# Patient Record
Sex: MALE | Race: BLACK OR AFRICAN AMERICAN
[De-identification: names, ages, dates, MRNs, and addresses within clinical notes are randomized per-mention and may not be internally consistent; named-entity substitution may affect disease eponyms.]

---

## 2017-01-14 ENCOUNTER — HOSPITAL ENCOUNTER (EMERGENCY)
Dept: HOSPITAL 72 - EMR | Age: 32
Discharge: HOME | End: 2017-01-14
Payer: MEDICARE

## 2017-01-14 VITALS — HEIGHT: 70 IN | WEIGHT: 160 LBS | BODY MASS INDEX: 22.9 KG/M2

## 2017-01-14 VITALS — DIASTOLIC BLOOD PRESSURE: 83 MMHG | SYSTOLIC BLOOD PRESSURE: 122 MMHG

## 2017-01-14 VITALS — SYSTOLIC BLOOD PRESSURE: 122 MMHG | DIASTOLIC BLOOD PRESSURE: 83 MMHG

## 2017-01-14 DIAGNOSIS — N34.2: ICD-10-CM

## 2017-01-14 DIAGNOSIS — I12.0: Primary | ICD-10-CM

## 2017-01-14 DIAGNOSIS — N18.6: ICD-10-CM

## 2017-01-14 DIAGNOSIS — Z88.0: ICD-10-CM

## 2017-01-14 DIAGNOSIS — J02.9: ICD-10-CM

## 2017-01-14 DIAGNOSIS — Z99.2: ICD-10-CM

## 2017-01-14 DIAGNOSIS — E11.9: ICD-10-CM

## 2017-01-14 PROCEDURE — 99284 EMERGENCY DEPT VISIT MOD MDM: CPT

## 2017-01-15 ENCOUNTER — HOSPITAL ENCOUNTER (EMERGENCY)
Dept: HOSPITAL 72 - EMR | Age: 32
Discharge: HOME | End: 2017-01-15
Payer: MEDICARE

## 2017-01-15 VITALS — DIASTOLIC BLOOD PRESSURE: 74 MMHG | SYSTOLIC BLOOD PRESSURE: 127 MMHG

## 2017-01-15 VITALS — WEIGHT: 160 LBS | BODY MASS INDEX: 25.71 KG/M2 | HEIGHT: 66 IN

## 2017-01-15 VITALS — SYSTOLIC BLOOD PRESSURE: 127 MMHG | DIASTOLIC BLOOD PRESSURE: 74 MMHG

## 2017-01-15 DIAGNOSIS — L72.9: ICD-10-CM

## 2017-01-15 DIAGNOSIS — K64.9: Primary | ICD-10-CM

## 2017-01-15 DIAGNOSIS — I12.0: ICD-10-CM

## 2017-01-15 DIAGNOSIS — Z88.0: ICD-10-CM

## 2017-01-15 DIAGNOSIS — Z99.2: ICD-10-CM

## 2017-01-15 DIAGNOSIS — N18.6: ICD-10-CM

## 2017-01-15 PROCEDURE — 99282 EMERGENCY DEPT VISIT SF MDM: CPT

## 2017-01-18 NOTE — EMERGENCY ROOM REPORT
History of Present Illness


General


Chief Complaint:  General Complaint


Source:  Patient





Present Illness


HPI


Patient with r armpit swelling and tenderness - bump.  Also has bump in rectum.

  Pain is moderate and not radiate.  Sharp aching.  No rectal bleeding.  Able 

to move bowels.  No NVD.  No fever.





Seen yesterday:


Patient presented for a sore throat and urethral discharge.  Differential 

diagnosis included was not limited to strep pharyngitis, urethritis, herpes, 

yeast infection and among others. Patient's benign exam and does not appear to 

require any further imaging or laboratory testing at this time.  The patient 

was given azithromycin a prescription for antibiotics.The patient is advised to 

follow up with  primary care doctor in 1-2 days. The patient was advised that 

he would need further STD testing.   Patient is advised to return if any 

worsening condition or if any changes in status that are concerning.











Dialysis patient .


Allergies:  


Coded Allergies:  


     PENICILLINS (Verified  Allergy, Unknown, 1/14/17)





Patient History


Past Medical History:  see triage record


Social History:  Denies: smoking


Social History Narrative


at home


Reviewed Nursing Documentation:  PMH: Agreed, PSxH: Agreed





Nursing Documentation-PMH


Past Medical History:  No History, Except For


Hx Hypertension:  Yes


Hx Diabetes:  Yes


Hx Dialysis:  Yes - Monday, Wednesday, Friday,ESRD; Shunt Right Arm





Review of Systems


All Other Systems:  negative except mentioned in HPI





Physical Exam





Vital Signs








  Date Time  Temp Pulse Resp B/P Pulse Ox O2 Delivery O2 Flow Rate FiO2


 


1/15/17 22:34  70 16 127/74 97 Room Air  








Sp02 EP Interpretation:  reviewed, normal


General Appearance:  normal inspection, well appearing, no apparent distress


Head:  normocephalic, atraumatic


Eyes:  bilateral eye PERRL, bilateral eye normal inspection


ENT:  hearing grossly normal, normal voice


Neck:  full range of motion, supple


Respiratory:  no respiratory distress, speaking full sentences


Cardiovascular #1:  regular rate, rhythm


Gastrointestinal:  normal bowel sounds, non tender


Rectal:  hemorrhoids


Musculoskeletal:  gait/station normal, normal range of motion


Neurologic:  alert, normal gait, grossly normal


Psychiatric:  mood/affect normal


Skin:  other - SC nodule, no fluctuance R armpit.  No ertythema





Medical Decision Making


Diagnostic Impression:  


 Primary Impression:  


 Hemorrhoid


 Qualified Codes:  K64.0 - First degree hemorrhoids


 Additional Impressions:  


 Subcutaneous cyst R axilla


 ESRD


ER Course


Patient with r armpit and rectal issues.  DDx: abscess, cellulitis, hemorrhoid, 

abscess.  No fluctuance or erythema.  Will give local care.  The fact he is on 

doxy would help to cover MRSA.





Patient stable for outpatient observation and treatment.





Last Vital Signs








  Date Time  Temp Pulse Resp B/P Pulse Ox O2 Delivery O2 Flow Rate FiO2


 


1/15/17 23:30  75 16 127/74 97 Room Air  








Status:  improved


Disposition:  HOME, SELF-CARE


Condition:  Stable


Scripts


Bacitracin (Bacitracin) 28.4 Gm Oint...g.


1 APPLIC TOPIC BID, #20 GM


   Prov: Jamar Darnell M.D.         1/15/17 


Hydrocortisone Acetate* (ANUSOL-HC*) 25 Mg Supp.rect


1 SUPP RECTAL TWICE A DAY, #20 SUPP


   Prov: Jamar Darnell M.D.         1/15/17


Patient Instructions:  Lymphadenopathy, Hemorrhoids





Additional Instructions:  


Continue the antibiotics.





Use the antibiotic ointment twice a day in the armpit.











Jamar Darnell M.D. Jan 18, 2017 04:00

## 2018-12-07 ENCOUNTER — HOSPITAL ENCOUNTER (EMERGENCY)
Dept: HOSPITAL 72 - EMR | Age: 33
Discharge: HOME | End: 2018-12-07
Payer: MEDICARE

## 2018-12-07 VITALS — HEIGHT: 66 IN | WEIGHT: 150 LBS | BODY MASS INDEX: 24.11 KG/M2

## 2018-12-07 VITALS — DIASTOLIC BLOOD PRESSURE: 98 MMHG | SYSTOLIC BLOOD PRESSURE: 133 MMHG

## 2018-12-07 DIAGNOSIS — Z88.0: ICD-10-CM

## 2018-12-07 DIAGNOSIS — I10: ICD-10-CM

## 2018-12-07 DIAGNOSIS — Z20.2: Primary | ICD-10-CM

## 2018-12-07 DIAGNOSIS — E11.9: ICD-10-CM

## 2018-12-07 LAB
APPEARANCE UR: CLEAR
APTT PPP: (no result) S
GLUCOSE UR STRIP-MCNC: NEGATIVE MG/DL
KETONES UR QL STRIP: NEGATIVE
LEUKOCYTE ESTERASE UR QL STRIP: NEGATIVE
NITRITE UR QL STRIP: NEGATIVE
PH UR STRIP: 5 [PH] (ref 4.5–8)
PROT UR QL STRIP: NEGATIVE
SP GR UR STRIP: 1.02 (ref 1–1.03)
UROBILINOGEN UR-MCNC: NORMAL MG/DL (ref 0–1)

## 2018-12-07 PROCEDURE — 81003 URINALYSIS AUTO W/O SCOPE: CPT

## 2018-12-07 NOTE — EMERGENCY ROOM REPORT
History of Present Illness


General


Chief Complaint:  Male Urogenital Problems


Source:  Patient, Medical Record





Present Illness


HPI


The patient is a 34-year-old male presenting for STD exposure.  He states that 

his girlfriend was diagnosed with chlamydia yesterday and treated.  He has not 

had sexual contact since she has been treated.  He denies having any symptoms 

including penile discharge, dysuria, hematuria, abdominal pain, rash


Allergies:  


Coded Allergies:  


     PENICILLINS (Verified  Allergy, Unknown, 1/14/17)





Patient History


Past Medical History:  see triage record


Pertinent Family History:  none


Reviewed Nursing Documentation:  PMH: Agreed; PSxH: Agreed





Nursing Documentation-PMH


Past Medical History:  No History, Except For


Hx Cardiac Problems:  No - lupus


Hx Hypertension:  Yes


Hx Diabetes:  Yes


Hx Dialysis:  No -  right kidney transplant on 06/09, old Shunt Right Arm





Review of Systems


All Other Systems:  negative except mentioned in HPI





Physical Exam





Vital Signs








  Date Time  Temp Pulse Resp B/P (MAP) Pulse Ox O2 Delivery O2 Flow Rate FiO2


 


12/7/18 18:09 97.5 78 18 133/98 96 Room Air  








Sp02 EP Interpretation:  reviewed, normal


General Appearance:  no apparent distress, alert, GCS 15, non-toxic


Head:  normocephalic, atraumatic


Eyes:  bilateral eye normal inspection, bilateral eye PERRL


ENT:  hearing grossly normal, normal voice


Genitourinary:  normal inspection, no CVA tenderness


Musculoskeletal:  back normal, gait/station normal, normal range of motion, non-

tender


Neurologic:  alert, oriented x3, responsive, motor strength/tone normal, 

sensory intact, speech normal


Psychiatric:  judgement/insight normal, memory normal, mood/affect normal, no 

suicidal/homicidal ideation


Skin:  normal color, no rash, warm/dry, well hydrated





Medical Decision Making


PA Attestation


Dr. Salas is my supervising physician. Patient management was discussed with 

my supervising physician


Diagnostic Impression:  


 Primary Impression:  


 STD exposure


ER Course


The patient is a 34-year-old male presenting for STD exposure





Differential diagnosis considered but not limited to: Chlamydia, gonorrhea, 

trichomoniasis, herpes





PE: Vitals WNL.


NAD. 


Abdomen: Normal appearance. Non distended..


Normal BS. Non TTP. No McBurney point tenderness. No guarding. 


No CVA tenderness





Pt is treated for chlamydia and gonorrhea and will be MI'ed home. ER 

precautions given





Laboratory Tests








Test


  12/7/18


18:24


 


Urine Color Pale yellow  


 


Urine Appearance Clear  


 


Urine pH 5 (4.5-8.0)  


 


Urine Specific Gravity


  1.020


(1.005-1.035)


 


Urine Protein


  Negative


(NEGATIVE)


 


Urine Glucose (UA)


  Negative


(NEGATIVE)


 


Urine Ketones


  Negative


(NEGATIVE)


 


Urine Blood


  Negative


(NEGATIVE)


 


Urine Nitrite


  Negative


(NEGATIVE)


 


Urine Bilirubin


  Negative


(NEGATIVE)


 


Urine Urobilinogen


  Normal MG/DL


(0.0-1.0)


 


Urine Leukocyte Esterase


  Negative


(NEGATIVE)








Lab Results Impression


Unremarkable





Last Vital Signs








  Date Time  Temp Pulse Resp B/P (MAP) Pulse Ox O2 Delivery O2 Flow Rate FiO2


 


12/7/18 19:03 97.5 83 18 133/98 96 Room Air  








Status:  improved


Disposition:  HOME, SELF-CARE


Condition:  Improved


Referrals:  


NOT CHOSEN [facility]/MD,REFERRING (PCP)


Patient Instructions:  Safe Sex





Additional Instructions:  


I discussed my findings with the patient. All questions and concerns have been 

answered. Treatment and medication compliance have been addressed. I advised 

the patient that they need to follow up with PMD in 3-5 days. Return to ED if 

symptoms worsen, new symptoms arise, or if needed for any reason. Patient 

verbalized understanding of discharge instructions.











JORGE LUIS GARCIA Dec 7, 2018 19:53

## 2021-06-24 ENCOUNTER — HOSPITAL ENCOUNTER (EMERGENCY)
Dept: HOSPITAL 87 - ER | Age: 36
Discharge: HOME | End: 2021-06-24
Payer: MEDICARE

## 2021-06-24 VITALS — WEIGHT: 160.5 LBS | BODY MASS INDEX: 25.79 KG/M2 | HEIGHT: 66 IN

## 2021-06-24 VITALS — DIASTOLIC BLOOD PRESSURE: 88 MMHG | SYSTOLIC BLOOD PRESSURE: 116 MMHG

## 2021-06-24 DIAGNOSIS — M32.9: ICD-10-CM

## 2021-06-24 DIAGNOSIS — R41.82: ICD-10-CM

## 2021-06-24 DIAGNOSIS — K59.00: Primary | ICD-10-CM

## 2021-06-24 DIAGNOSIS — I10: ICD-10-CM
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
## 2021-06-24 DIAGNOSIS — T50.903A: ICD-10-CM
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
## 2021-06-24 DIAGNOSIS — T18.5XXA: ICD-10-CM

## 2021-06-24 LAB
BASOPHILS NFR BLD AUTO: 0.5 % (ref 0–2)
CHLORIDE SERPL-SCNC: 110 MEQ/L (ref 98–107)
EOSINOPHIL NFR BLD AUTO: 0.6 % (ref 0–5)
ERYTHROCYTE [DISTWIDTH] IN BLOOD BY AUTOMATED COUNT: 14.4 % (ref 11.6–14.6)
ETHANOL SERPL-MCNC: < 10 MG/DL
HCT VFR BLD AUTO: 42.9 % (ref 42–52)
HGB BLD-MCNC: 15.1 G/DL (ref 14–18)
LYMPHOCYTES NFR BLD AUTO: 32.2 % (ref 20–50)
MCH RBC QN AUTO: 30.1 PG (ref 28–32)
MCV RBC AUTO: 85.6 FL (ref 80–94)
MONOCYTES NFR BLD AUTO: 11 % (ref 2–8)
NEUTROPHILS NFR BLD AUTO: 55.7 % (ref 40–76)
PLATELET # BLD AUTO: 193 X1000/UL (ref 130–400)
PMV BLD AUTO: 9.4 FL (ref 7.4–10.4)
RBC # BLD AUTO: 5.01 MILL/UL (ref 4.7–6.1)

## 2021-06-24 PROCEDURE — 36415 COLL VENOUS BLD VENIPUNCTURE: CPT

## 2021-06-24 PROCEDURE — 80053 COMPREHEN METABOLIC PANEL: CPT

## 2021-06-24 PROCEDURE — 80329 ANALGESICS NON-OPIOID 1 OR 2: CPT

## 2021-06-24 PROCEDURE — 99284 EMERGENCY DEPT VISIT MOD MDM: CPT

## 2021-06-24 PROCEDURE — 81003 URINALYSIS AUTO W/O SCOPE: CPT

## 2021-06-24 PROCEDURE — 80305 DRUG TEST PRSMV DIR OPT OBS: CPT

## 2021-06-24 PROCEDURE — G0480 DRUG TEST DEF 1-7 CLASSES: HCPCS

## 2021-06-24 PROCEDURE — 80307 DRUG TEST PRSMV CHEM ANLYZR: CPT

## 2021-06-24 PROCEDURE — 85025 COMPLETE CBC W/AUTO DIFF WBC: CPT

## 2021-06-24 PROCEDURE — 80320 DRUG SCREEN QUANTALCOHOLS: CPT

## 2021-06-24 PROCEDURE — 74018 RADEX ABDOMEN 1 VIEW: CPT
